# Patient Record
Sex: FEMALE | Race: WHITE
[De-identification: names, ages, dates, MRNs, and addresses within clinical notes are randomized per-mention and may not be internally consistent; named-entity substitution may affect disease eponyms.]

---

## 2018-12-18 ENCOUNTER — HOSPITAL ENCOUNTER (INPATIENT)
Dept: HOSPITAL 97 - ER | Age: 53
LOS: 8 days | Discharge: HOME HEALTH | DRG: 488 | End: 2018-12-26
Attending: FAMILY MEDICINE | Admitting: INTERNAL MEDICINE
Payer: COMMERCIAL

## 2018-12-18 VITALS — BODY MASS INDEX: 35.6 KG/M2

## 2018-12-18 DIAGNOSIS — M23.221: ICD-10-CM

## 2018-12-18 DIAGNOSIS — M71.21: ICD-10-CM

## 2018-12-18 DIAGNOSIS — E78.5: ICD-10-CM

## 2018-12-18 DIAGNOSIS — G25.81: ICD-10-CM

## 2018-12-18 DIAGNOSIS — I10: ICD-10-CM

## 2018-12-18 DIAGNOSIS — K21.9: ICD-10-CM

## 2018-12-18 DIAGNOSIS — B96.20: ICD-10-CM

## 2018-12-18 DIAGNOSIS — M25.461: ICD-10-CM

## 2018-12-18 DIAGNOSIS — M00.9: Primary | ICD-10-CM

## 2018-12-18 DIAGNOSIS — N39.0: ICD-10-CM

## 2018-12-18 DIAGNOSIS — J45.20: ICD-10-CM

## 2018-12-18 DIAGNOSIS — E87.6: ICD-10-CM

## 2018-12-18 LAB
ALBUMIN SERPL BCP-MCNC: 3.8 G/DL (ref 3.4–5)
ALP SERPL-CCNC: 66 U/L (ref 45–117)
ALT SERPL W P-5'-P-CCNC: 40 U/L (ref 12–78)
AST SERPL W P-5'-P-CCNC: 28 U/L (ref 15–37)
BUN BLD-MCNC: 17 MG/DL (ref 7–18)
GLUCOSE SERPLBLD-MCNC: 115 MG/DL (ref 74–106)
HCT VFR BLD CALC: 39.7 % (ref 36–45)
INR BLD: 1.08
LYMPHOCYTES # SPEC AUTO: 2.6 K/UL (ref 0.7–4.9)
MAGNESIUM SERPL-MCNC: 1.9 MG/DL (ref 1.8–2.4)
NT-PROBNP SERPL-MCNC: 109 PG/ML (ref ?–125)
PMV BLD: 8.6 FL (ref 7.6–11.3)
POTASSIUM SERPL-SCNC: 3.5 MMOL/L (ref 3.5–5.1)
RBC # BLD: 4.42 M/UL (ref 3.86–4.86)
TROPONIN (EMERG DEPT USE ONLY): 0.03 NG/ML (ref 0–0.04)

## 2018-12-18 PROCEDURE — 87186 SC STD MICRODIL/AGAR DIL: CPT

## 2018-12-18 PROCEDURE — 87804 INFLUENZA ASSAY W/OPTIC: CPT

## 2018-12-18 PROCEDURE — 96375 TX/PRO/DX INJ NEW DRUG ADDON: CPT

## 2018-12-18 PROCEDURE — 81003 URINALYSIS AUTO W/O SCOPE: CPT

## 2018-12-18 PROCEDURE — 97530 THERAPEUTIC ACTIVITIES: CPT

## 2018-12-18 PROCEDURE — 84132 ASSAY OF SERUM POTASSIUM: CPT

## 2018-12-18 PROCEDURE — 93005 ELECTROCARDIOGRAM TRACING: CPT

## 2018-12-18 PROCEDURE — 87077 CULTURE AEROBIC IDENTIFY: CPT

## 2018-12-18 PROCEDURE — 89050 BODY FLUID CELL COUNT: CPT

## 2018-12-18 PROCEDURE — 87086 URINE CULTURE/COLONY COUNT: CPT

## 2018-12-18 PROCEDURE — 87088 URINE BACTERIA CULTURE: CPT

## 2018-12-18 PROCEDURE — 84484 ASSAY OF TROPONIN QUANT: CPT

## 2018-12-18 PROCEDURE — 99285 EMERGENCY DEPT VISIT HI MDM: CPT

## 2018-12-18 PROCEDURE — 80076 HEPATIC FUNCTION PANEL: CPT

## 2018-12-18 PROCEDURE — 83605 ASSAY OF LACTIC ACID: CPT

## 2018-12-18 PROCEDURE — 87040 BLOOD CULTURE FOR BACTERIA: CPT

## 2018-12-18 PROCEDURE — 85610 PROTHROMBIN TIME: CPT

## 2018-12-18 PROCEDURE — 93971 EXTREMITY STUDY: CPT

## 2018-12-18 PROCEDURE — 87075 CULTR BACTERIA EXCEPT BLOOD: CPT

## 2018-12-18 PROCEDURE — 83880 ASSAY OF NATRIURETIC PEPTIDE: CPT

## 2018-12-18 PROCEDURE — 83690 ASSAY OF LIPASE: CPT

## 2018-12-18 PROCEDURE — 0S9C3ZZ DRAINAGE OF RIGHT KNEE JOINT, PERCUTANEOUS APPROACH: ICD-10-PCS

## 2018-12-18 PROCEDURE — 87205 SMEAR GRAM STAIN: CPT

## 2018-12-18 PROCEDURE — 84145 PROCALCITONIN (PCT): CPT

## 2018-12-18 PROCEDURE — 97116 GAIT TRAINING THERAPY: CPT

## 2018-12-18 PROCEDURE — 83735 ASSAY OF MAGNESIUM: CPT

## 2018-12-18 PROCEDURE — 84550 ASSAY OF BLOOD/URIC ACID: CPT

## 2018-12-18 PROCEDURE — 97163 PT EVAL HIGH COMPLEX 45 MIN: CPT

## 2018-12-18 PROCEDURE — 96365 THER/PROPH/DIAG IV INF INIT: CPT

## 2018-12-18 PROCEDURE — 89060 EXAM SYNOVIAL FLUID CRYSTALS: CPT

## 2018-12-18 PROCEDURE — 71045 X-RAY EXAM CHEST 1 VIEW: CPT

## 2018-12-18 PROCEDURE — 80053 COMPREHEN METABOLIC PANEL: CPT

## 2018-12-18 PROCEDURE — 80202 ASSAY OF VANCOMYCIN: CPT

## 2018-12-18 PROCEDURE — 36415 COLL VENOUS BLD VENIPUNCTURE: CPT

## 2018-12-18 PROCEDURE — 85025 COMPLETE CBC W/AUTO DIFF WBC: CPT

## 2018-12-18 PROCEDURE — 87070 CULTURE OTHR SPECIMN AEROBIC: CPT

## 2018-12-18 PROCEDURE — 80048 BASIC METABOLIC PNL TOTAL CA: CPT

## 2018-12-18 NOTE — EDPHYS
Physician Documentation                                                                           

 [facility]                                                                

Name: Katarzyna Chanel                                                                             

Age: 53 yrs                                                                                       

Sex: Female                                                                                       

: 1965                                                                                   

MRN: [id number]                                                                                   

Arrival Date: 2018                                                                          

Time: 18:11                                                                                       

Account#: [id number]                                                                            

Bed 6                                                                                             

Private MD: Lee Alonso ED Physician Eliseo Goff                                                                      

HPI:                                                                                              

                                                                                             

18:44 This 53 yrs old  Female presents to ER via Wheelchair with complaints of Knee  danyel 

      Pain.                                                                                       

18:44 The patient presents with decreased range of motion, pain, swelling, tenderness. The    danyel 

      complaints affect the posterior aspect of right knee and right knee. Context: The           

      problem was sustained at an unknown site, resulted from an unknown cause, the patient       

      can partially bear weight. Onset: The symptoms/episode began/occurred 3 day(s) ago.         

      Modifying factors: The symptoms are alleviated by remaining still, the symptoms are         

      aggravated by movement. Associated signs and symptoms: Pertinent positives: fever,          

      warmth. fever and chills, right knee swollen. The patient or guardian reports cough,        

      described as mild. Onset: The symptoms/episode began/occurred 1 week(s) ago. Severity       

      of symptoms: At their worst the symptoms were mild, moderate, in the emergency              

      department the symptoms are unchanged.                                                      

                                                                                                  

OB/GYN:                                                                                           

18:20 LMP N/A - Hysterectomy                                                                  ch  

                                                                                                  

Historical:                                                                                       

- Allergies:                                                                                      

18:20 Codeine;                                                                                ch  

- Home Meds:                                                                                      

18:20 Lisinopril Oral [Active]; diclofenac oral oral [Active];                                ch  

- PMHx:                                                                                           

18:20 Hypertension;                                                                           ch  

- PSHx:                                                                                           

18:20 ; Hysterectomy; Cholecystectomy; Heel spurr;                                   ch  

                                                                                                  

- Immunization history:: Adult Immunizations up to date, Flu vaccine is up to date.               

- Social history:: Smoking status: Patient/guardian denies using tobacco.                         

- Ebola Screening: : Patient negative for fever greater than or equal to 101.5 degrees            

  Fahrenheit, and additional compatible Ebola Virus Disease symptoms Patient denies               

  exposure to infectious person Patient denies travel to an Ebola-affected area in the            

  21 days before illness onset No symptoms or risks identified at this time.                      

- Family history:: not pertinent.                                                                 

                                                                                                  

                                                                                                  

ROS:                                                                                              

18:44 Eyes: Negative for injury, pain, redness, and discharge, ENT: Negative for injury,      danyel 

      pain, and discharge, Neck: Negative for injury, pain, and swelling, Cardiovascular:         

      Negative for chest pain, palpitations, and edema, Abdomen/GI: Negative for abdominal        

      pain, nausea, vomiting, diarrhea, and constipation, Back: Negative for injury and pain,     

      : Negative for injury, bleeding, discharge, and swelling, MS/Extremity: Negative for      

      injury and deformity, Skin: Negative for injury, rash, and discoloration, Neuro:            

      Negative for headache, weakness, numbness, tingling, and seizure, Psych: Negative for       

      depression, anxiety, suicide ideation, homicidal ideation, and hallucinations,              

      Allergy/Immunology: Negative for hives, rash, and allergies, Endocrine: Negative for        

      neck swelling, polydipsia, polyuria, polyphagia, and marked weight changes,                 

      Hematologic/Lymphatic: Negative for swollen nodes, abnormal bleeding, and unusual           

      bruising.                                                                                   

18:44 Constitutional: Positive for chills, fever, malaise.                                        

18:44 Cardiovascular: Positive for palpitations.                                                  

18:44 MS/extremity: Positive for decreased range of motion, pain, swelling, tenderness, of        

      the posterior aspect of right knee and right knee.                                          

                                                                                                  

Exam:                                                                                             

18:44 Head/Face:  Normocephalic, atraumatic. Eyes:  Pupils equal round and reactive to light, danyel 

      extra-ocular motions intact.  Lids and lashes normal.  Conjunctiva and sclera are           

      non-icteric and not injected.  Cornea within normal limits.  Periorbital areas with no      

      swelling, redness, or edema. ENT:  Nares patent. No nasal discharge, no septal              

      abnormalities noted.  Tympanic membranes are normal and external auditory canals are        

      clear.  Oropharynx with no redness, swelling, or masses, exudates, or evidence of           

      obstruction, uvula midline.  Mucous membranes moist. Neck:  Trachea midline, no             

      thyromegaly or masses palpated, and no cervical lymphadenopathy.  Supple, full range of     

      motion without nuchal rigidity, or vertebral point tenderness.  No Meningismus.             

      Chest/axilla:  Normal chest wall appearance and motion.  Nontender with no deformity.       

      No lesions are appreciated. Respiratory:  Lungs have equal breath sounds bilaterally,       

      clear to auscultation and percussion.  No rales, rhonchi or wheezes noted.  No              

      increased work of breathing, no retractions or nasal flaring. Abdomen/GI:  Soft,            

      non-tender, with normal bowel sounds.  No distension or tympany.  No guarding or            

      rebound.  No evidence of tenderness throughout. Back:  No spinal tenderness.  No            

      costovertebral tenderness.  Full range of motion. Female :  Normal external               

      genitalia. Skin:  Warm, dry with normal turgor.  Normal color with no rashes, no            

      lesions, and no evidence of cellulitis. Neuro:  Awake and alert, GCS 15, oriented to        

      person, place, time, and situation.  Cranial nerves II-XII grossly intact.  Motor           

      strength 5/5 in all extremities.  Sensory grossly intact.  Cerebellar exam normal.          

      Normal gait. Psych:  Awake, alert, with orientation to person, place and time.              

      Behavior, mood, and affect are within normal limits.                                        

18:44 Constitutional: The patient appears febrile.                                                

18:44 Musculoskeletal/extremity: Extremities: decreased ROM, pain, swelling, tenderness, ROM:     

      limited active range of motion, limited passive range of motion, Circulation is intact      

      in all extremities. Compartment Syndrome exam of affected extremity: is normal. DVT         

      Exam: negative Homans' sign noted on exam, no appreciated bluish discoloration, no          

      erythema, pain, swelling, tenderness, increased warmth, that is moderate, of the right      

      leg, of the  posterior aspect of right knee and right knee.                                 

                                                                                                  

Vital Signs:                                                                                      

18:20  / 105; Pulse 110; Resp 20; Temp 102.6(O); Pulse Ox 99% on R/A; Weight 83.46 kg;  ch  

      Height 5 ft. 2 in. (157.48 cm); Pain 8/10;                                                  

22:15  / 74; Pulse 72; Resp 17 S; Pulse Ox 97% on R/A;                                  jd3 

23:33  / 65; Pulse 62; Resp 17 S; Pulse Ox 95% on R/A;                                  jd3 

18:20 Body Mass Index 33.65 (83.46 kg, 157.48 cm)                                               

                                                                                                  

Procedures:                                                                                       

22:06 Joint Treatment: of right knee using 18 gauge needle, Removed cloudy fluid, yellow      danyel 

      fluid, Specimen sent to lab. Dressed with band aid, Neosporin, Patient tolerated well.      

                                                                                                  

MDM:                                                                                              

18:28 Patient medically screened.                                                             [facility] 

18:49 Data reviewed: vital signs, nurses notes, lab test result(s), EKG, radiologic studies,  [facility] 

      doppler, plain films.                                                                       

                                                                                                  

                                                                                             

18:29 Order name: Basic Metabolic Panel; Complete Time: 21:14                                   

                                                                                             

18: Order name: CBC with Diff; Complete Time: 21:14                                           

                                                                                             

18: Order name: LFT's; Complete Time: 21:14                                                   

18                                                                                             

18:29 Order name: Magnesium; Complete Time: 21:14                                               

                                                                                             

18:29 Order name: NT PRO-BNP; Complete Time: 21:14                                              

                                                                                             

18:29 Order name: PT-INR; Complete Time: 21:14                                                  

18                                                                                             

18:29 Order name: Troponin (emerg Dept Use Only); Complete Time: 21:14                          

                                                                                             

18:30 Order name: Lactate; Complete Time: 21:14                                                 

                                                                                             

18:30 Order name: Procalcitonin; Complete Time: 21:14                                           

                                                                                             

18:30 Order name: Blood Culture Adult (2)                                                       

                                                                                             

18:43 Order name: Flu; Complete Time: 21:14                                                   [facility] 

                                                                                             

18:43 Order name: Lipase; Complete Time: 21:14                                                [facility] 

                                                                                             

18:43 Order name: Urine Culture                                                               [facility] 

                                                                                             

20:01 Order name: Urine Dipstick--Ancillary (enter results); Complete Time: 21:14             mt  

                                                                                             

18:29 Order name: XRAY Chest (1 view); Complete Time: 21:14                                     

                                                                                             

18:29 Order name: EKG; Complete Time: 18:30                                                     

                                                                                             

18:29 Order name: Cardiac monitoring; Complete Time: 18:41                                      

                                                                                             

18:29 Order name: EKG - Nurse/Tech; Complete Time: 19:02                                        

                                                                                             

18:30 Order name: XRAY Knee RIGHT 3 view; Complete Time: 21:14                                  

                                                                                             

18:43 Order name: US Extremity Venous Unilateral Ltd; Complete Time: 21:14                    [facility] 

                                                                                             

21:42 Order name: CONS Physician Consult                                                      [facility]

                                                                                             

22:36 Order name: Body Fluid Cell Count                                                       [facility]

                                                                                             

22:36 Order name: Miscellaneous Test Lab                                                      [facility]

                                                                                             

22:36 Order name: Body Fluid Crystals                                                         [facility]

                                                                                             

22:36 Order name: Body Fluid Culture                                                          [facility]

                                                                                             

18:29 Order name: IV Saline Lock; Complete Time: 18:41                                          

                                                                                             

18:29 Order name: Labs collected and sent; Complete Time: 18:41                                 

                                                                                             

18:29 Order name: O2 Per Protocol; Complete Time: 18:41                                         

                                                                                             

18:29 Order name: O2 Sat Monitoring; Complete Time: 18:41                                       

                                                                                             

18:43 Order name: Urine Dipstick-Ancillary (obtain specimen); Complete Time: 21:29            [facility] 

                                                                                                  

Administered Medications:                                                                         

18:30 Drug: Tylenol 1000 mg Route: PO;                                                          

23:12 Follow up: Response: No adverse reaction                                                jd3 

18:53 Drug: fentaNYL (PF) 50 mcg Route: IVP; Site: right antecubital;                         iw  

22:10 Follow up: Response: No adverse reaction                                                jd3 

18:53 Drug: Zofran 4 mg Route: IVP; Site: right antecubital;                                  iw  

22:11 Follow up: Response: No adverse reaction                                                jd3 

22:10 Drug: Cefepime 2 grams Route: IVPB; Rate: 200 ml/hr; Infused Over: 30 mins; Site: right jd3 

      antecubital;                                                                                

22:49 Follow up: Response: No adverse reaction; IV Status: Completed infusion                 j 

22:48 Drug: vancoMYCIN 1 grams Route: IVPB; Infused Over: 2 hrs; Site: right antecubital;     j 

23:00 Follow up: Response: No adverse reaction; IV Status: Infusion continued upon admission  j 

                                                                                                  

                                                                                                  

Disposition:                                                                                      

18 21:25 Hospitalization ordered by Lee Alonso for Inpatient Admission. Preliminary       

  diagnosis are Effusion, right knee, Synovial cyst of popliteal space [Baker],                   

  right knee - 5 cm, Fever, unspecified, Cough.                                                   

- Bed requested for Telemetry/MedSurg (Inpatient).                                                

- Status is Inpatient Admission.                                                              jd3 

- Condition is Stable.                                                                            

- Problem is new.                                                                                 

- Symptoms have improved.                                                                         

UTI on Admission? No                                                                              

                                                                                                  

                                                                                                  

                                                                                                  

Signatures:                                                                                       

Dispatcher [employer]                           EDMS                                                 

Ursula Sevilla RN RN ch Lewis, Kimberly, RN RN kl Anderson, Corey, MD MD cha Williams, Irene, RN RN iw Davies, Jonathon, RN RN   jd3                                                  

                                                                                                  

Corrections: (The following items were deleted from the chart)                                    

21:26 21:25 Hospitalization Ordered by Lee Alonso MD for Inpatient Admission. Preliminary    [facility] 

      diagnosis is Effusion, right knee; Synovial cyst of popliteal space [Sosa], right knee     

      - 5 cm. Bed requested for Telemetry/MedSurg (Inpatient). Status is Inpatient Admission.     

      Condition is Stable. Problem is new. Symptoms have improved. UTI on Admission? No. [facility]      

21:56 21:26 2018 21:25 Hospitalization Ordered by Lee Alonso MD for Inpatient          kl  

      Admission. Preliminary diagnosis is Effusion, right knee; Synovial cyst of popliteal        

      space [Sosa], right knee - 5 cm; Fever, unspecified; Cough. Bed requested for              

      Telemetry/MedSurg (Inpatient). Status is Inpatient Admission. Condition is Stable.          

      Problem is new. Symptoms have improved. UTI on Admission? No. danyel                           

23:38 21:56 2018 21:25 Hospitalization Ordered by Lee Alonso MD for Inpatient          jd3 

      Admission. Preliminary diagnosis is Effusion, right knee; Synovial cyst of popliteal        

      space [Sosa], right knee - 5 cm; Fever, unspecified; Cough. Bed requested for              

      Telemetry/MedSurg (Inpatient). Status is Inpatient Admission. Condition is Stable.          

      Problem is new. Symptoms have improved. UTI on Admission? No. kl                            

                                                                                                  

**************************************************************************************************

## 2018-12-18 NOTE — RAD REPORT
EXAM DESCRIPTION:  US - Extremity Venous Uni Ltd - 12/18/2018 7:13 pm

 

CLINICAL HISTORY:  Leg pain and swelling

 

COMPARISON:  None.

 

TECHNIQUE:  Real-time sonographic evaluation of the right lower extremity deep venous systems was per
formed.

 

FINDINGS:  Normal compressibility, flow augmentation, phasic flow and spontaneous flow are identified
 in the right lower extremity common femoral, superficial femoral, popliteal and posterior tibial vei
ns. No intraluminal filling defects seen.

 

A 5 centimeter complex popliteal fossa cyst is present. No cyst rupture or hemorrhage suspected.

 

IMPRESSION:  No DVT in the right lower extremity.

 

Large 5 centimeter popliteal fossa cyst.

## 2018-12-18 NOTE — RAD REPORT
EXAM DESCRIPTION:  RAD - Chest Single View - 12/18/2018 7:40 pm

 

CLINICAL HISTORY:  Chest pain

 

COMPARISON:  March 2016

 

TECHNIQUE:  AP portable chest image was obtained 1920 hours .

 

FINDINGS:  Lungs are clear. Heart and vasculature are normal. No measurable pleural effusion and no p
neumothorax. No acute bony abnormality seen. No acute aortic findings suspected.

 

IMPRESSION:  No acute cardiopulmonary process.

 

 No significant interval change.

## 2018-12-18 NOTE — ER
Nurse's Notes                                                                                     

 [facility]                                                                

Name: Katarzyna Chanel                                                                             

Age: 53 yrs                                                                                       

Sex: Female                                                                                       

: 1965                                                                                   

MRN: [id number]                                                                                   

Arrival Date: 2018                                                                          

Time: 18:11                                                                                       

Account#: [id number]                                                                            

Bed 6                                                                                             

Private MD: Lee Alonso                                                                          

Diagnosis: Effusion, right knee;Synovial cyst of popliteal space [Baker], right knee-5 cm;Fever,  

  unspecified;Cough                                                                               

                                                                                                  

Presentation:                                                                                     

                                                                                             

18:18 Presenting complaint: Patient states: pain to R knee for the past two weeks, the past   ch  

      day it has gotten very painful, and swollen all around. Dr. Alonso said it probably           

      wasn't a blood clot because it was hard, but now that it is swelling I should come in.      

      as well I just dont feel well. Transition of care: patient was not received from            

      another setting of care. Onset of symptoms was 2018. Risk Assessment: Do       

      you want to hurt yourself or someone else? Patient reports no desire to harm self or        

      others. Initial Sepsis Screen: Does the patient meet any 2 criteria? Temp <36.0*C           

      (96.8*F)) or > 38.3*C (100.9*F). HR > 90 bpm. Yes Does the patient have a suspected         

      source of infection? Yes: Other: possible joint infection. Care prior to arrival: None.     

18:18 Method Of Arrival: Wheelchair                                                             

18:18 Acuity: LAURA 2                                                                             

                                                                                                  

Triage Assessment:                                                                                

18:20 General: Appears in no apparent distress. comfortable, Behavior is calm, cooperative,   ch  

      appropriate for age. Pain: Complains of pain in right knee.                                 

                                                                                                  

OB/GYN:                                                                                           

18:20 LMP N/A - Hysterectomy                                                                    

                                                                                                  

Historical:                                                                                       

- Allergies:                                                                                      

18:20 Codeine;                                                                                  

- Home Meds:                                                                                      

18:20 Lisinopril Oral [Active]; diclofenac oral oral [Active];                                ch  

- PMHx:                                                                                           

18:20 Hypertension;                                                                             

- PSHx:                                                                                           

18:20 ; Hysterectomy; Cholecystectomy; Heel spurr;                                     

                                                                                                  

- Immunization history:: Adult Immunizations up to date, Flu vaccine is up to date.               

- Social history:: Smoking status: Patient/guardian denies using tobacco.                         

- Ebola Screening: : Patient negative for fever greater than or equal to 101.5 degrees            

  Fahrenheit, and additional compatible Ebola Virus Disease symptoms Patient denies               

  exposure to infectious person Patient denies travel to an Ebola-affected area in the            

  21 days before illness onset No symptoms or risks identified at this time.                      

- Family history:: not pertinent.                                                                 

                                                                                                  

                                                                                                  

Screenin:30 Abuse screen: Denies threats or abuse. Nutritional screening: No deficits noted.        aa5 

      Tuberculosis screening: No symptoms or risk factors identified. Fall Risk None              

      identified.                                                                                 

                                                                                                  

Assessment:                                                                                       

18:30 General: Appears uncomfortable, Behavior is calm, cooperative. Pain: Complains of pain  aa5 

      in posterior aspect of right knee Pain currently is 8 out of 10 on a pain scale.            

      Quality of pain is described as sharp, tender, Pain began 2 weeks ago Is continuous,        

      Aggravated by increased activity, repositioning. Neuro: Level of Consciousness is           

      awake, alert, obeys commands, Oriented to person, place, time, situation.                   

      Cardiovascular: Heart tones S1 S2 present Rhythm is regular. Respiratory: Airway is         

      patent Respiratory effort is even, unlabored, Respiratory pattern is regular,               

      symmetrical, Breath sounds are clear bilaterally. GI: Abdomen is round non-distended,       

      Bowel sounds present X 4 quads. Abd is soft and non tender X 4 quads. : No signs          

      and/or symptoms were reported regarding the genitourinary system. EENT: No signs and/or     

      symptoms were reported regarding the EENT system. Derm: Skin is dry, Skin is normal,        

      Skin temperature is hot. Musculoskeletal: Swelling present in posterior aspect of right     

      knee and anterior aspect of right knee. Pt denies any injury to right knee.                 

22:15 Reassessment: Patient appears in no apparent distress at this time. No changes from     jd3 

      previously documented assessment. Patient and/or family updated on plan of care and         

      expected duration. Pain level reassessed. Patient is alert, oriented x 3, equal             

      unlabored respirations, skin warm/dry/pink.                                                 

23:33 Reassessment: Patient appears in no apparent distress at this time. No changes from     jd3 

      previously documented assessment. Patient and/or family updated on plan of care and         

      expected duration. Pain level reassessed. Patient is alert, oriented x 3, equal             

      unlabored respirations, skin warm/dry/pink.                                                 

                                                                                                  

Vital Signs:                                                                                      

18:20  / 105; Pulse 110; Resp 20; Temp 102.6(O); Pulse Ox 99% on R/A; Weight 83.46 kg;  ch  

      Height 5 ft. 2 in. (157.48 cm); Pain 8/10;                                                  

22:15  / 74; Pulse 72; Resp 17 S; Pulse Ox 97% on R/A;                                  jd3 

23:33  / 65; Pulse 62; Resp 17 S; Pulse Ox 95% on R/A;                                  jd3 

18:20 Body Mass Index 33.65 (83.46 kg, 157.48 cm)                                               

                                                                                                  

ED Course:                                                                                        

18:11 Patient arrived in ED.                                                                  sb2 

18:11 Lee Alonso MD is Private Physician.                                                  sb2 

18:19 Triage completed.                                                                       ch  

18:20 Arm band placed on left wrist. Patient placed in an exam room, on a stretcher.          ch  

18:28 Eliseo Goff MD is Attending Physician.                                             danyel 

18:30 Patient has correct armband on for positive identification. Placed in gown. Bed in low  aa5 

      position. Call light in reach. Side rails up X2. Cardiac monitor on. Pulse ox on. NIBP      

      on.                                                                                         

18:40 Maryuri Baker RN is Primary Nurse.                                                   aa5 

18:40 Initial lab(s) drawn, by me, sent to lab. Inserted saline lock: 20 gauge in right       aa5 

      antecubital area, using aseptic technique. Blood collected.                                 

18:40 No provider procedures requiring assistance completed.                                  aa5 

19:03 EKG done, by ED staff, reviewed by Eliseo Goff MD.                                   ag  

19:05 Report given to AGATA Staples.                                                              aa5 

19:06 Ultrasound completed. Patient tolerated well.                                           cy  

19:06 US Extremity Venous Unilateral Ltd In Process Unspecified.                              EDMS

19:41 XRAY Chest (1 view) In Process Unspecified.                                             EDMS

19:41 XRAY Knee RIGHT 3 view In Process Unspecified.                                          EDMS

21:21 Lee Alonso MD is Hospitalizing Provider.                                             danyel 

22:59 Patient admitted, IV remains in place.                                                  jd3 

                                                                                                  

Administered Medications:                                                                         

18:30 Drug: Tylenol 1000 mg Route: PO;                                                        ch  

23:12 Follow up: Response: No adverse reaction                                                jd3 

18:53 Drug: fentaNYL (PF) 50 mcg Route: IVP; Site: right antecubital;                         iw  

22:10 Follow up: Response: No adverse reaction                                                jd3 

18:53 Drug: Zofran 4 mg Route: IVP; Site: right antecubital;                                  iw  

22:11 Follow up: Response: No adverse reaction                                                jd3 

22:10 Drug: Cefepime 2 grams Route: IVPB; Rate: 200 ml/hr; Infused Over: 30 mins; Site: right jd3 

      antecubital;                                                                                

22:49 Follow up: Response: No adverse reaction; IV Status: Completed infusion                 jd3 

22:48 Drug: vancoMYCIN 1 grams Route: IVPB; Infused Over: 2 hrs; Site: right antecubital;     jd3 

23:00 Follow up: Response: No adverse reaction; IV Status: Infusion continued upon admission  jd3 

                                                                                                  

                                                                                                  

Outcome:                                                                                          

21:25 Decision to Hospitalize by Provider.                                                    danyel 

22:58 Admitted to Med/surg accompanied by tech, via stretcher, room 408, with chart, Report   jd3 

      called to  Katelyn PARMAR                                                                        

22:58 Condition: stable                                                                           

22:58 Instructed on the need for admit, Demonstrated understanding of instructions.               

23:38 Patient left the ED.                                                                    jd3 

                                                                                                  

Signatures:                                                                                       

Dispatcher [employer]                           EDUrsula Cool, RN                  Eliseo Virk ch, MD MD cha Williams, Irene RN                     Maryuri Liu RN                     RN   Serenity Pelayo Jonathon, RN RN jd3 Yong, Chheannith cy Billeau, Sheri                               sb2                                                  

                                                                                                  

Corrections: (The following items were deleted from the chart)                                    

19:20 18:30 Musculoskeletal: Range of motion: intact in all extremities, aa5                  aa5 

                                                                                                  

**************************************************************************************************

## 2018-12-18 NOTE — RAD REPORT
EXAM DESCRIPTION:  RAD - Knee Right 3 View - 12/18/2018 7:40 pm

 

CLINICAL HISTORY:  Progressive knee pain

 

COMPARISON:  None.

 

FINDINGS:  No fracture, dislocation or periosteal reaction.Small to moderate joint effusion is presen
t. No joint space narrowing. Degenerative meniscal calcifications are present. Joint capsule calcific
ations are seen. No suspicious soft tissue finding.

 

 

IMPRESSION:  Right knee small to moderate joint effusion.

 

Degenerative meniscal calcifications. No acute bone finding.

 

Clinical concerns for internal derangement or occult bony injury could be further assessed with MR im
aging.

## 2018-12-19 LAB
BUN BLD-MCNC: 17 MG/DL (ref 7–18)
GLUCOSE SERPLBLD-MCNC: 116 MG/DL (ref 74–106)
HCT VFR BLD CALC: 35.6 % (ref 36–45)
LYMPHOCYTES # SPEC AUTO: 2.6 K/UL (ref 0.7–4.9)
PMV BLD: 8.9 FL (ref 7.6–11.3)
POTASSIUM SERPL-SCNC: 3.6 MMOL/L (ref 3.5–5.1)
RBC # BLD: 3.99 M/UL (ref 3.86–4.86)

## 2018-12-19 PROCEDURE — 0S9C00Z DRAINAGE OF RIGHT KNEE JOINT WITH DRAINAGE DEVICE, OPEN APPROACH: ICD-10-PCS

## 2018-12-19 RX ADMIN — ENOXAPARIN SODIUM SCH MG: 40 INJECTION SUBCUTANEOUS at 08:38

## 2018-12-19 RX ADMIN — SODIUM CHLORIDE SCH MLS: 9 INJECTION, SOLUTION INTRAVENOUS at 18:40

## 2018-12-19 RX ADMIN — MORPHINE SULFATE PRN MG: 4 INJECTION, SOLUTION INTRAMUSCULAR; INTRAVENOUS at 20:28

## 2018-12-19 RX ADMIN — SODIUM CHLORIDE SCH: 0.9 INJECTION, SOLUTION INTRAVENOUS at 08:07

## 2018-12-19 RX ADMIN — Medication SCH: at 09:00

## 2018-12-19 RX ADMIN — ONDANSETRON PRN MG: 2 INJECTION INTRAMUSCULAR; INTRAVENOUS at 19:37

## 2018-12-19 RX ADMIN — SODIUM CHLORIDE SCH MLS: 0.9 INJECTION, SOLUTION INTRAVENOUS at 02:34

## 2018-12-19 RX ADMIN — DEXTROSE AND SODIUM CHLORIDE SCH MLS: 5; .9 INJECTION, SOLUTION INTRAVENOUS at 11:00

## 2018-12-19 RX ADMIN — CEFEPIME SCH MLS: 1 INJECTION, POWDER, FOR SOLUTION INTRAMUSCULAR; INTRAVENOUS at 10:00

## 2018-12-19 RX ADMIN — CEFEPIME SCH MLS: 1 INJECTION, POWDER, FOR SOLUTION INTRAMUSCULAR; INTRAVENOUS at 22:48

## 2018-12-19 RX ADMIN — Medication SCH ML: at 22:48

## 2018-12-19 RX ADMIN — SODIUM CHLORIDE SCH: 0.9 INJECTION, SOLUTION INTRAVENOUS at 16:07

## 2018-12-19 NOTE — RAD REPORT
EXAM DESCRIPTION:  MRI - Knee Right Wo Cont - 12/19/2018 9:52 am

 

CLINICAL HISTORY:  R knee pain, swelling, baker's cyst

Pain and swelling

 

COMPARISON:  Knee Right 3 View dated 12/18/2018

 

FINDINGS:  The medial meniscus demonstrates abnormal intermediate signal in the posterior horn contac
ting the inferior articular surface, compatible with a nondisplaced tear. The lateral meniscus demons
trates degenerative signal without a tear.

 

The ACL and PCL are intact.

 

The collateral ligaments are intact.

 

Static patellar alignment is normal. Patellar retinacula are intact. Patellar and quadriceps tendons 
are normal. 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

 

No fracture suspected. Osteoarthritic changes are noted particularly involving the medial compartment
 with chondromalacia and subchondral cyst formation.

 

A moderate to large joint effusion is present with a large Baker's cyst noted measuring 5.7 x 4.9 x 3
.2 cm (CC x T x AP).

 

IMPRESSION:  Large Baker's cyst with moderate to large joint effusion noted.

 

Osteoarthritic changes involving the patellofemoral joint and medial joint compartment predominately.


 

Small nondisplaced medial meniscal tear.

## 2018-12-19 NOTE — EKG
Test Date:    2018-12-18               Test Time:    18:48:04

Technician:   AMG                                    

                                                     

MEASUREMENT RESULTS:                                       

Intervals:                                           

Rate:         91                                     

MA:           146                                    

QRSD:         84                                     

QT:           364                                    

QTc:          447                                    

Axis:                                                

P:            37                                     

MA:           146                                    

QRS:          -20                                    

T:            25                                     

                                                     

INTERPRETIVE STATEMENTS:                                       

                                                     

Normal sinus rhythm

Moderate voltage criteria for LVH, may be normal variant

Borderline ECG

No previous ECG available for comparison



Electronically Signed On 12-19-18 07:43:23 CST by Matt Garrett

## 2018-12-19 NOTE — CON
Preoperative Diagnosis:  Right knee effusion, probable septic knee.



History Of Present Illness:  Ms. Chanel is a 53-year-old woman, 5 foot and 2 inches, 194 pounds, comp
lains of pain in her right knee.  Historically, she was experiencing a slight amount of pain on [place]
y, went to see Dr. Alonso on Monday, and by yesterday, was unable to walk.  She was admitted to the [facility] room in the early hours of the morning last night.  She was had an aspiration performed and fe
lt some relief after aspiration.  The aspiration fluid reveals no organisms, but white blood cell cou
nt of 44,000 with no crystals.  She has no history of gout.  She has been febrile with a normal perip
heral white count, but the fluid within the knee showed a profound left shift.



Physical Examination:

General:  On exam, she is she is well developed, well nourished, alert, and oriented, complains of pa
in in the knee.  She reports that the effusion has recurred since her initial aspiration. 

Vital Signs:  Her fever was up to 101.8.



Laboratory Data:  Her peripheral white count is 7.9.  Cultures are pending.  The Gram stain was negat
willis.  She appears to have some aspect of urinary tract infection between 10,00 and 100,000 CFUs/mL.  
There are 3+ gram-negative rods in her urine on a clean-catch specimen.



Impression:  This seems likely represents a septic knee.  We will proceed with an irrigation and debr
idement and arthrotomy of the knee 

with large-bore Hemovac drains.  She is being taken to the operative suite in short order.





EDILIA

DD:  12/19/2018 17:15:17Voice ID:  [id number]

DT:  12/19/2018 22:43:01Report ID:  [id number]

## 2018-12-20 RX ADMIN — DEXTROSE AND SODIUM CHLORIDE SCH: 5; .9 INJECTION, SOLUTION INTRAVENOUS at 00:20

## 2018-12-20 RX ADMIN — CEFEPIME SCH MLS: 1 INJECTION, POWDER, FOR SOLUTION INTRAMUSCULAR; INTRAVENOUS at 20:26

## 2018-12-20 RX ADMIN — ONDANSETRON PRN MG: 2 INJECTION INTRAMUSCULAR; INTRAVENOUS at 18:01

## 2018-12-20 RX ADMIN — MORPHINE SULFATE PRN MG: 4 INJECTION, SOLUTION INTRAMUSCULAR; INTRAVENOUS at 01:07

## 2018-12-20 RX ADMIN — PROMETHAZINE HYDROCHLORIDE PRN MG: 25 INJECTION INTRAMUSCULAR; INTRAVENOUS at 14:49

## 2018-12-20 RX ADMIN — Medication SCH ML: at 09:16

## 2018-12-20 RX ADMIN — Medication SCH EA: at 09:15

## 2018-12-20 RX ADMIN — ONDANSETRON PRN MG: 2 INJECTION INTRAMUSCULAR; INTRAVENOUS at 12:37

## 2018-12-20 RX ADMIN — ENOXAPARIN SODIUM SCH MG: 40 INJECTION SUBCUTANEOUS at 09:14

## 2018-12-20 RX ADMIN — HYDROMORPHONE HYDROCHLORIDE PRN MG: 1 INJECTION, SOLUTION INTRAMUSCULAR; INTRAVENOUS; SUBCUTANEOUS at 07:52

## 2018-12-20 RX ADMIN — Medication SCH ML: at 20:26

## 2018-12-20 RX ADMIN — SODIUM CHLORIDE SCH MLS: 9 INJECTION, SOLUTION INTRAVENOUS at 09:16

## 2018-12-20 RX ADMIN — ONDANSETRON PRN MG: 2 INJECTION INTRAMUSCULAR; INTRAVENOUS at 05:49

## 2018-12-20 RX ADMIN — CEFEPIME SCH MLS: 1 INJECTION, POWDER, FOR SOLUTION INTRAMUSCULAR; INTRAVENOUS at 09:16

## 2018-12-20 RX ADMIN — HYDROMORPHONE HYDROCHLORIDE PRN MG: 1 INJECTION, SOLUTION INTRAMUSCULAR; INTRAVENOUS; SUBCUTANEOUS at 22:33

## 2018-12-20 RX ADMIN — PROMETHAZINE HYDROCHLORIDE PRN MG: 25 INJECTION INTRAMUSCULAR; INTRAVENOUS at 22:32

## 2018-12-20 RX ADMIN — HYDROMORPHONE HYDROCHLORIDE PRN MG: 1 INJECTION, SOLUTION INTRAMUSCULAR; INTRAVENOUS; SUBCUTANEOUS at 12:38

## 2018-12-20 RX ADMIN — HYDROMORPHONE HYDROCHLORIDE PRN MG: 1 INJECTION, SOLUTION INTRAMUSCULAR; INTRAVENOUS; SUBCUTANEOUS at 18:00

## 2018-12-20 RX ADMIN — MORPHINE SULFATE PRN MG: 4 INJECTION, SOLUTION INTRAMUSCULAR; INTRAVENOUS at 05:31

## 2018-12-20 NOTE — OP
Surgeon:  Brent Ford MD



Preoperative Diagnosis:  Septic right knee.



Postoperative Diagnosis:  Septic right knee.



Procedure Performed:  Arthrotomy and I and D of right knee.



First Assistant:  None.



Anesthesia:  General.



Disposition:  Recovery room stable.



Operative Report In Detail:  The patient was taken to operative suite.  Placed in supine position.  I
nduced anesthesia.  Right knee prepped and draped __________ with exsanguination via gravity techniqu
e.  An incision was made just medial to the patella, taken to the retinaculum, into the joint.  Copio
us amount of __________ cloudy fluid was expressed.  Cultures were taken.  It was irrigated with 4 L 
of sterile saline solution.  Two large-bore Hemovac drains were placed exiting in the superior aspect
 of the wound.  A full-thickness closure of the wound was performed followed by application of a knee
 immobilizer.  The patient is being reversed from anesthesia and should be in the recovery room at ti
me of this dictation.





EDILIA

DD:  12/19/2018 18:00:23Voice ID:  [id number]

DT:  12/20/2018 04:12:41Report ID:  [id number]

## 2018-12-20 NOTE — HP
Date of Admission:  2018



Chief Complaint:  Right knee swelling and pain.



History Of Present Illness:  This is a 53-year-old pleasant female patient who 
started to have some swelling and pain on the back of the right knee.  This 
pain was very minimum.  She had no fever.  No fall.  No injury.  She came in to 
see me on 2018.  After I examined, I was concerned about Baker cyst of 
the right popliteal fossa, but the swelling did not have typical consistency of 
cyst as there were some areas that were of hard consistency like underlying 
mass or bony consistency.  The patient was advised to see orthopedic surgeon on 
an outpatient basis.  Meanwhile, yesterday she contacted office and informed us 
that her pain from back of the knee now has actually started to involve her 
anterior knee and she has started to have swelling of her knee itself.  With 
that complaint, she was advised to come to the emergency room and when she came 
into emergency room, her temperature was 102 degrees Fahrenheit and the patient 
did not realize at home that she had fever.  After she was evaluated in the ER, 
routine labs were done, chest x-ray.  There was no definite source of infection 
anywhere else except we were concerned about possibility of septic arthritis 
and arthrocentesis was done in the emergency room and the patient was started 
after arthrocentesis on IV vancomycin and IV cefepime and admitted to the 
hospital.  I saw her this morning for this hospital admission.



Allergies:  TO CODEINE.



Review of Systems:

Constitutional:  As mentioned above. 

Musculoskeletal:  As mentioned above. 



All other systems reviewed and negative.



Past Medical History:  Significant for hyperlipidemia, hypertension, impaired 
fasting glucose, restless legs syndrome, gastroesophageal reflux disease, mild 
intermittent asthma.



Past Surgical History:  Total hysterectomy in , hernia repair, breast biopsy
, cholecystectomy, , removal of bone spur from bilateral foot and 
removal of uterine polyps.



Family History:  Significant for lung cancer, hypertension, cirrhosis of liver, 
ovarian cancer, diabetes.



Social History:  Negative for smoking or alcohol use.



Medications:  Aspirin 81 mg p.o. daily, diclofenac 50 mg p.o. 2 times a day 
which was started 2 days ago, Nexium 40 mg p.o. daily, lisinopril/HCTZ 20/25 
one p.o. daily, ProAir inhaler p.r.n.



Physical Examination:

Vital Signs:  When she came into emergency room, her temperature was 102.6 
degrees Fahrenheit, pulse rate 110, respiratory rate 20, blood pressure 168/105
, oxygen saturation 99%, height 5 feet 2 inches, weight 194 pounds. 

General:  Awake, alert, oriented, not in distress. 

HEENT:  Head atraumatic, normocephalic.  Conjunctivae nonerythematous.  Sclerae 
white.  Mouth, no thrush or edema noted.  Ears/Nose, no mass, lesion, discharge 
noted. 

Neck:  Supple. No JVD, lymph nodes, bruit, thyromegaly noted. 

Lungs:  Bilateral good equal air entry. Clear to auscultation. No rhonchi.  No 
rales. 

Heart:  Normal heart sounds, no murmur or gallop. 

Abdomen:  Soft, bowel sounds normal. No guarding, rigidity, tenderness, mass, 
hepatosplenomegaly, distention, or bruit noted. 

Extremities:  The patient has swelling of the right knee anteriorly as well as 
posteriorly and the right popliteal fossa has presence of Baker cyst. 

Skin:  No rash, ulcer, cellulitis. 

Lymphatics:  No lymph node enlargement in neck, supraclavicular, 
infraclavicular region. 

Neuro:  No focal neurological deficit. 

Chest:  Unremarkable. 

External Genitalia:  Deferred. 

Rectal:  Deferred.



Laboratory Data:  Yesterday white count 9.9, hemoglobin 13.6, platelets 243.  
This morning white count 7.9, hemoglobin 12.1, platelets 192.  Yesterday sodium 
137, potassium 3.5, chloride 102, bicarb 31, BUN 17, creatinine 0.90, glucose 
115.  Liver function tests unremarkable.  Procalcitonin less than 0.05.  Lipase 
228.  This morning sodium 139, potassium 3.6, chloride 100, bicarb 30, BUN 17, 
creatinine 0.70, glucose 116.  Urinalysis shows positive for leukocyte esterase
, otherwise negative.  Synovial fluid analysis from the right knee aspirate 
done in the emergency room yesterday:  WBC count 46,626; RBC count 5908; 
differential 94% neutrophils.  Synovial fluid culture pending.  Gram stain, no 
organisms or WBC was reported on the gram stain.  Influenza A and B test 
negative.  Blood culture pending.  Right knee x-ray shows mild-to-moderate 
effusion, degenerative meniscal calcification, no acute bone findings.  Venous 
Doppler, no DVT of right lower extremity.



Impression:  

1.   Septic arthritis, right knee.

2.   Baker cyst, right knee.

3.   Hypertension.

4.   Hyperlipidemia.

5.   Mild intermittent asthma.

6.   Impaired fasting glucose.

7.   Restless legs syndrome.

8.   Gastroesophageal reflux disease.



Plan:  Admit the patient to hospital for further evaluation and management of 
this problem.  The patient is appropriate for inpatient and is expected to 
spend 2 midnights in hospital.  We will go ahead and give empiric antibiotic, 
which are cefepime and vancomycin.  Consult orthopedic surgeon, Dr. Ford 
who is on-call.  I did call him this morning and discussed details with him 
about her clinical presentation including her synovial fluid analysis and Dr. Ford has concerns about septic arthritis so he has decided to take her to 
surgery for this concern and surgery will be done today.  We have kept the 
patient n.p.o. We will give IV fluid, pain medications per order, IV 
antibiotics per order.  Home medications will be continued per order.  I will 
see her tomorrow for followup.  Uric acid and MRI of the right knee were 
ordered.





MAIA/DERICK

DD:  2018 20:22:21   Voice ID:  [id number]

MTDD

## 2018-12-20 NOTE — P.PN
Subjective


Date of Service: 12/20/18


Chief Complaint: right knee pain


Subjective: No new changes (pod 1 s/p wash out of right knee)





Review of Systems


10-point ROS is otherwise unremarkable





Physical Examination





- Vital Signs


Temperature: 98.4 F


Blood Pressure: 142/79


Pulse: 73


Respirations: 16


Pulse Ox (%): 98





- Physical Exam


General: Oriented x3


HEENT: Atraumatic, Normocephalic, PERRLA


Musculoskeletal: Other (dressings intact, drain in place)





- Studies


Microbiology Data (last 24 hrs): 








12/18/18 22:05   Body Fluid - Knee   Gram Stain - Final


12/18/18 19:43   Clean Catch Urine   [place] Count - Final


                            BETWEEN 10,000 & 100,000 CFU/ML


12/18/18 19:43   Clean Catch Urine    - Final


                            Escherichia Coli








Assessment And Plan





- Current Problems (Diagnosis)


(1) Effusion of knee joint right


Current Visit: Yes   Status: Acute   


Plan: 


right knee effusion washed out pod 1, dr Ford will pull the drains tomorrow





Plan to discharge in: 48 Hours

## 2018-12-21 LAB
BUN BLD-MCNC: 9 MG/DL (ref 7–18)
GLUCOSE SERPLBLD-MCNC: 120 MG/DL (ref 74–106)
HCT VFR BLD CALC: 34.8 % (ref 36–45)
LYMPHOCYTES # SPEC AUTO: 1.9 K/UL (ref 0.7–4.9)
MAGNESIUM SERPL-MCNC: 1.6 MG/DL (ref 1.8–2.4)
PMV BLD: 8.6 FL (ref 7.6–11.3)
POTASSIUM SERPL-SCNC: 3.3 MMOL/L (ref 3.5–5.1)
RBC # BLD: 3.94 M/UL (ref 3.86–4.86)

## 2018-12-21 RX ADMIN — HYDROMORPHONE HYDROCHLORIDE PRN MG: 1 INJECTION, SOLUTION INTRAMUSCULAR; INTRAVENOUS; SUBCUTANEOUS at 02:03

## 2018-12-21 RX ADMIN — SODIUM CHLORIDE SCH MLS: 9 INJECTION, SOLUTION INTRAVENOUS at 05:13

## 2018-12-21 RX ADMIN — HYDROMORPHONE HYDROCHLORIDE PRN MG: 1 INJECTION, SOLUTION INTRAMUSCULAR; INTRAVENOUS; SUBCUTANEOUS at 12:40

## 2018-12-21 RX ADMIN — PROMETHAZINE HYDROCHLORIDE PRN MG: 25 INJECTION INTRAMUSCULAR; INTRAVENOUS at 05:30

## 2018-12-21 RX ADMIN — CEFEPIME SCH MLS: 1 INJECTION, POWDER, FOR SOLUTION INTRAMUSCULAR; INTRAVENOUS at 20:13

## 2018-12-21 RX ADMIN — HYDROMORPHONE HYDROCHLORIDE PRN MG: 1 INJECTION, SOLUTION INTRAMUSCULAR; INTRAVENOUS; SUBCUTANEOUS at 05:30

## 2018-12-21 RX ADMIN — Medication SCH EA: at 08:18

## 2018-12-21 RX ADMIN — CEFEPIME SCH MLS: 1 INJECTION, POWDER, FOR SOLUTION INTRAMUSCULAR; INTRAVENOUS at 08:27

## 2018-12-21 RX ADMIN — Medication SCH ML: at 20:14

## 2018-12-21 RX ADMIN — ENOXAPARIN SODIUM SCH MG: 40 INJECTION SUBCUTANEOUS at 08:18

## 2018-12-21 RX ADMIN — Medication SCH ML: at 08:19

## 2018-12-21 RX ADMIN — PROMETHAZINE HYDROCHLORIDE PRN MG: 25 INJECTION INTRAMUSCULAR; INTRAVENOUS at 02:03

## 2018-12-21 RX ADMIN — HYDROMORPHONE HYDROCHLORIDE PRN MG: 1 INJECTION, SOLUTION INTRAMUSCULAR; INTRAVENOUS; SUBCUTANEOUS at 18:33

## 2018-12-21 RX ADMIN — SODIUM CHLORIDE SCH MLS: 9 INJECTION, SOLUTION INTRAVENOUS at 22:14

## 2018-12-21 RX ADMIN — HYDROMORPHONE HYDROCHLORIDE PRN MG: 1 INJECTION, SOLUTION INTRAMUSCULAR; INTRAVENOUS; SUBCUTANEOUS at 22:13

## 2018-12-21 NOTE — PN
Date of Progress Note:  12/20/2018



Subjective:  The patient was seen this morning for followup.  She had surgery done by Dr. Ford ye
sterday late evening for septic arthritis of right knee.  This morning, the patient was having lot of
 pain in her knee and morphine was not helping to control her pain.  She was started on tramadol by m
outh and morphine was discontinued and IV Dilaudid was started.  During the course of day today, her 
nausea was not well controlled with Zofran, so Phenergan was added.



Objective:  Vital Signs:  Reviewed. 

HEENT:  Unremarkable. 

Lungs:  Clear to auscultation. 

Heart:  Sounds normal. 

Abdomen:  Soft.  Bowel sounds normal.  No guarding, rigidity, tenderness, distention. 

Extremities:  Right lower extremity has surgical dressing present with drain tube.



Impression:  

1.Septic arthritis, right knee.

2.Hypertension.



Plan:  We will continue current empiric antibiotic which is vancomycin and cefepime.  Synovial fluid 
culture which was collected in the emergency room prior to starting antibiotic, result is pending.  D
epending on that final result, decision will be made on which particular antibiotic to use.  I will s
ee her tomorrow for followup.  We will continue to follow with Dr. Ford.





MAIA/MODL

DD:  12/20/2018 20:38:23Voice ID:  [id number]

DT:  12/21/2018 02:18:53Report ID:  [id number]

## 2018-12-21 NOTE — P.PN
Subjective


Date of Service: 12/21/18


Chief Complaint: right knee pain


Subjective: No new changes, Tolerating diet, Ambulating, Doing well (culture s 

no growth at  48 hrs will  f/u  may need to treat emperically)





Physical Examination





- Vital Signs


Temperature: 100.1 F


Blood Pressure: 148/71


Pulse: 91


Respirations: 16


Pulse Ox (%): 95





- Studies


Microbiology Data (last 24 hrs): 








12/18/18 22:05   Body Fluid - Knee   Gram Stain - Final

## 2018-12-21 NOTE — CON
History Of Present Illness:  This is a 53-year-old female I was consulted for right-sided septic knee
.  The patient is on IV antibiotic at this time, feels better, but has just had surgery and her leg i
s in a surgical splint.  The patient denies any headache, nausea, vomiting, chest pain, abdominal jordana
n, constipation, or diarrhea.  The patient came in with swelling in her back of her knee which temper
ature went up to 102.  Her white count is normal and also her procalcitonin is normal.  She was given
 IV vancomycin and cefepime and hospitalized for further evaluation.



Past Medical History:  Hyperlipidemia, high blood pressure, restless legs syndrome, arthritis, gastro
esophageal reflux disease.



Past Surgical History:  Total hysterectomy, breast biopsy, cholecystectomy.



Social History:  Nonsmoker and nondrinker.



Family History:  Cancer, hypertension, ovarian cancer, diabetes mellitus, cirrhosis of liver.



Current Medications:  Cefepime and vancomycin.  See MARs for other medication.



Allergies:  CODEINE.



Review of Systems:

A 10-point review was performed.



Physical Examination:

General:  This is a 53-year-old female, lying in bed, not in any acute cardiopulmonary distress. 

Vital Signs:  Temperature 100.1, pulse 91, respirations 16, blood pressure 148/71. 

HEENT:  Unremarkable. 

Neck:  Supple. 

Lungs:  Basal crackles. 

Heart:  S1, S2.  Regular. 

Abdomen:  Soft, nontender.  Bowel sounds positive. 

Extremities:  Right leg under surgical dressing with NATE drain in place.



Laboratory Data:  Shows WBC 7.6, hemoglobin 11.8, platelets are 212.  Chemistry shows sodium 137, pot
assium 3.3, chloride 101, bicarb 30, BUN 0.7, glucose is 88, procalcitonin is 0.05.  Micro data:  E c
giulia in her urine, sensitive to cefepime.



Assessment And Plan:  Right knee septic arthritis versus bursitis status post surgical debridement lo
w-grade fevers.  Continue antibiotic for urinary tract infection secondary to Escherichia coli.  Cont
inue supportive care and wound care.  We will follow the patient closely. 



Thank you, Dr. Alonso and Dr. Dumont, for consult.





NF/MODL

DD:  12/21/2018 12:52:58Voice ID:  [id number]

DT:  12/21/2018 17:06:40Report ID:  [id number]

## 2018-12-22 RX ADMIN — TRAMADOL HYDROCHLORIDE PRN MG: 50 TABLET, COATED ORAL at 03:52

## 2018-12-22 RX ADMIN — HYDROMORPHONE HYDROCHLORIDE PRN MG: 1 INJECTION, SOLUTION INTRAMUSCULAR; INTRAVENOUS; SUBCUTANEOUS at 02:13

## 2018-12-22 RX ADMIN — CEFEPIME SCH MLS: 1 INJECTION, POWDER, FOR SOLUTION INTRAMUSCULAR; INTRAVENOUS at 20:01

## 2018-12-22 RX ADMIN — CEFEPIME SCH MLS: 1 INJECTION, POWDER, FOR SOLUTION INTRAMUSCULAR; INTRAVENOUS at 08:19

## 2018-12-22 RX ADMIN — HYDROMORPHONE HYDROCHLORIDE PRN MG: 1 INJECTION, SOLUTION INTRAMUSCULAR; INTRAVENOUS; SUBCUTANEOUS at 16:18

## 2018-12-22 RX ADMIN — Medication SCH ML: at 08:19

## 2018-12-22 RX ADMIN — ACETAMINOPHEN PRN MG: 325 TABLET ORAL at 16:19

## 2018-12-22 RX ADMIN — HYDROMORPHONE HYDROCHLORIDE PRN MG: 1 INJECTION, SOLUTION INTRAMUSCULAR; INTRAVENOUS; SUBCUTANEOUS at 20:01

## 2018-12-22 RX ADMIN — Medication SCH EA: at 08:17

## 2018-12-22 RX ADMIN — ENOXAPARIN SODIUM SCH MG: 40 INJECTION SUBCUTANEOUS at 08:17

## 2018-12-22 RX ADMIN — SODIUM CHLORIDE SCH MLS: 9 INJECTION, SOLUTION INTRAVENOUS at 16:19

## 2018-12-22 RX ADMIN — HYDROMORPHONE HYDROCHLORIDE PRN MG: 1 INJECTION, SOLUTION INTRAMUSCULAR; INTRAVENOUS; SUBCUTANEOUS at 09:38

## 2018-12-22 RX ADMIN — Medication SCH ML: at 20:00

## 2018-12-22 RX ADMIN — HYDROMORPHONE HYDROCHLORIDE PRN MG: 1 INJECTION, SOLUTION INTRAMUSCULAR; INTRAVENOUS; SUBCUTANEOUS at 06:55

## 2018-12-22 NOTE — PN
Date of Progress Note:  12/21/2018



Subjective:  The patient was seen this morning for followup.  Her nausea that she had yesterday is we
ll controlled with Phenergan.  Her appetite is slowly improving.  The pain is under good control with
 current pain medication.  She has not had a bowel movement since she is in the hospital.



Objective:  Vital Signs:  Reviewed. 

HEENT:  Examination unremarkable. 

Lungs:  Clear to auscultation.  No rhonchi.  No rales. 

Heart:  Sounds normal. 

Abdomen:  Soft.  Bowel sounds normal.  No guarding, rigidity, tenderness, or distention.  Extremities
:  No leg edema.



Laboratory Data:  White count 7.6, hemoglobin 11.8, platelets 212, sodium 137, potassium 3.3, chlorid
e 101, bicarb 30, BUN 9, creatinine 0.70, glucose 120.  Her synovial fluid culture is negative so far
.  Urine culture growing E coli.



Impression:  

1.Right knee acute septic arthritis.

2.Urinary tract infection.

3.Hypertension.

4.Hypokalemia.



Plan:  We will continue to follow with Dr. Ford's from Orthopedic Surgery.  I have requested cons
ultation from Infectious Disease specialist Dr. Covington, for right knee septic arthritis problem and h
is help will be appreciated for antibiotic management.  We will continue current antibiotics.  The van yun is on cefepime and vancomycin.  

Details were discussed with hospitalist, Dr. Dumont, who [facility].





MAIA/MODL

DD:  12/21/2018 23:12:54Voice ID:  [id number]

DT:  12/22/2018 03:32:57Report ID:  [id number]

## 2018-12-22 NOTE — P.PN
Subjective


Date of Service: 12/22/18


Chief Complaint: right knee pain





Patient seen and examined at bedside with RN.  Chart reviewed.  Patient 

currently complaining of having left knee pain.  Stated that while working with 

physical therapy yesterday she twisted her knee and thinks that she might have 

broken something.  Patient unable to bear weight on the affected knee at that 

time.  Right knee status post surgery has been recovering well.





Review of Systems


10-point ROS is otherwise unremarkable





Physical Examination





- Vital Signs


Temperature: 99.7 F


Blood Pressure: 116/65


Pulse: 97


Respirations: 16


Pulse Ox (%): 94





- Physical Exam


General: Alert, In no apparent distress, Obese


HEENT: Atraumatic, PERRLA, EOMI


Neck: Supple, JVD not distended


Respiratory: Clear to auscultation bilaterally, Normal air movement


Cardiovascular: Regular rate/rhythm, Normal S1 S2


Gastrointestinal: Normal bowel sounds, No tenderness


Musculoskeletal: Other (Right knee in knee immobilizer.  Left knee with limited 

range of motion and tenderness to touch on the lateral aspect.)


Integumentary: No rashes


Neurological: Normal speech, Normal tone, Normal affect


Lymphatics: No axilla or inguinal lymphadenopathy





- Studies


Microbiology Data (last 24 hrs): 








12/18/18 22:05   Body Fluid - Knee   Gram Stain - Final





Medications List Reviewed: Yes





Assessment And Plan





- Plan





Impression/plan:  





1.Right knee acute septic arthritis - status post surgical debridement with 

Orthopedics.  Culture negative thus far.  However significant white blood cell 

and neutrophils noted on the for thoracentesis.  Infectious Disease consulted 

for recommendations of IV antibiotics.  Currently on vancomycin cefepime.  Will 

follow up with the infectious disease regarding the length and appropriate 

antibiotics at this time. 





2.Urinary tract infection -urine culture positive for E. coli.  Currently on 

vancomycin cefepime.  Will continue that here in the hospital





3.Hypertension - stable at this time.  Continue home medication





4. Left knee pain - possible ligament versus meniscus tear.  Will get an x-ray 

at this time.  





Disposition: 





Patient is to currently continue on IV antibiotics.  Patient will most likely 

need antibiotics for 4-6 weeks. Will follow up with infectious disease 

regarding the length and the appropriate antibiotics.  Will also follow up with 

a left knee x-ray to rule out any acute abnormality.  Patient to continue 

working with physical therapy at this time.  Currently pending improvement.  


Discharge Plan: Home


Plan to discharge in: 48 Hours





- Code Status/Comfort Care


Code Status Assessed: Yes


Critical Care: No

## 2018-12-22 NOTE — RAD REPORT
EXAM DESCRIPTION:  RAD - Knee Left 2 View - 12/22/2018 2:05 pm

 

CLINICAL HISTORY:   Left knee pain

 

FINDINGS:  No fracture or dislocation is seen.

 

Mild to moderate medial joint space narrowing with small osteophytes.

 

Limited two view series obtained

## 2018-12-23 LAB
ALBUMIN SERPL BCP-MCNC: 2.6 G/DL (ref 3.4–5)
ALP SERPL-CCNC: 64 U/L (ref 45–117)
ALT SERPL W P-5'-P-CCNC: 27 U/L (ref 12–78)
AST SERPL W P-5'-P-CCNC: 24 U/L (ref 15–37)
BUN BLD-MCNC: 16 MG/DL (ref 7–18)
BUN BLD-MCNC: 19 MG/DL (ref 7–18)
GLUCOSE SERPLBLD-MCNC: 140 MG/DL (ref 74–106)
GLUCOSE SERPLBLD-MCNC: 142 MG/DL (ref 74–106)
HCT VFR BLD CALC: 33.9 % (ref 36–45)
LYMPHOCYTES # SPEC AUTO: 1.6 K/UL (ref 0.7–4.9)
PMV BLD: 8.8 FL (ref 7.6–11.3)
POTASSIUM SERPL-SCNC: 3.7 MMOL/L (ref 3.5–5.1)
POTASSIUM SERPL-SCNC: 3.8 MMOL/L (ref 3.5–5.1)
RBC # BLD: 3.83 M/UL (ref 3.86–4.86)

## 2018-12-23 PROCEDURE — 02HV33Z INSERTION OF INFUSION DEVICE INTO SUPERIOR VENA CAVA, PERCUTANEOUS APPROACH: ICD-10-PCS

## 2018-12-23 PROCEDURE — B548ZZA ULTRASONOGRAPHY OF SUPERIOR VENA CAVA, GUIDANCE: ICD-10-PCS

## 2018-12-23 RX ADMIN — CEFEPIME SCH MLS: 1 INJECTION, POWDER, FOR SOLUTION INTRAMUSCULAR; INTRAVENOUS at 20:20

## 2018-12-23 RX ADMIN — CEFEPIME SCH MLS: 1 INJECTION, POWDER, FOR SOLUTION INTRAMUSCULAR; INTRAVENOUS at 08:54

## 2018-12-23 RX ADMIN — HYDROMORPHONE HYDROCHLORIDE PRN MG: 1 INJECTION, SOLUTION INTRAMUSCULAR; INTRAVENOUS; SUBCUTANEOUS at 05:19

## 2018-12-23 RX ADMIN — HYDROMORPHONE HYDROCHLORIDE PRN MG: 1 INJECTION, SOLUTION INTRAMUSCULAR; INTRAVENOUS; SUBCUTANEOUS at 08:55

## 2018-12-23 RX ADMIN — Medication SCH ML: at 10:49

## 2018-12-23 RX ADMIN — HYDROMORPHONE HYDROCHLORIDE PRN MG: 1 INJECTION, SOLUTION INTRAMUSCULAR; INTRAVENOUS; SUBCUTANEOUS at 23:36

## 2018-12-23 RX ADMIN — HYDROMORPHONE HYDROCHLORIDE PRN MG: 1 INJECTION, SOLUTION INTRAMUSCULAR; INTRAVENOUS; SUBCUTANEOUS at 00:07

## 2018-12-23 RX ADMIN — Medication SCH ML: at 19:36

## 2018-12-23 RX ADMIN — TRAMADOL HYDROCHLORIDE PRN MG: 50 TABLET, COATED ORAL at 12:34

## 2018-12-23 RX ADMIN — ENOXAPARIN SODIUM SCH MG: 40 INJECTION SUBCUTANEOUS at 08:54

## 2018-12-23 RX ADMIN — TRAMADOL HYDROCHLORIDE PRN MG: 50 TABLET, COATED ORAL at 21:39

## 2018-12-23 RX ADMIN — HYDROMORPHONE HYDROCHLORIDE PRN MG: 1 INJECTION, SOLUTION INTRAMUSCULAR; INTRAVENOUS; SUBCUTANEOUS at 19:33

## 2018-12-23 RX ADMIN — Medication SCH EA: at 08:55

## 2018-12-23 RX ADMIN — SODIUM CHLORIDE SCH MLS: 9 INJECTION, SOLUTION INTRAVENOUS at 10:48

## 2018-12-23 RX ADMIN — ACETAMINOPHEN PRN MG: 325 TABLET ORAL at 23:33

## 2018-12-23 NOTE — RAD REPORT
EXAM DESCRIPTION:  RAD - Chest Single View - 12/23/2018 4:58 pm

 

CLINICAL HISTORY:  PICC line placement

 

COMPARISON:  Chest Single View dated 12/18/2018; Chest Pa And Lat (2 Views) dated 3/31/2016

 

FINDINGS:  Portable chest was obtained following placement of a left upper extremity PICC line. The c
atheter tip projects over the right atrium. Consider 2 cm of retraction for optimum placement.

## 2018-12-23 NOTE — P.PN
Subjective


Date of Service: 12/23/18


Chief Complaint: right knee pain





Patient seen and examined at bedside with RN.  Chart reviewed.  No complaints 

to offer overnight.  Left knee pain has been resolved.  Working with physical 

therapy at this time.





Review of Systems


10-point ROS is otherwise unremarkable





Physical Examination





- Vital Signs


Temperature: 98.5 F


Blood Pressure: 123/58


Pulse: 92


Respirations: 20


Pulse Ox (%): 96





- Physical Exam


General: Alert, In no apparent distress


HEENT: Atraumatic, PERRLA, EOMI


Neck: Supple, JVD not distended


Respiratory: Clear to auscultation bilaterally, Normal air movement


Cardiovascular: Regular rate/rhythm, Normal S1 S2


Gastrointestinal: Normal bowel sounds, No tenderness


Musculoskeletal: Other (Right knee in immobilizer)


Integumentary: No rashes


Neurological: Normal speech, Normal tone, Normal affect


Lymphatics: No axilla or inguinal lymphadenopathy





- Studies


Microbiology Data (last 24 hrs): 








12/18/18 22:05   Body Fluid - Knee   Gram Stain - Final


12/18/18 22:05   Body Fluid - Knee   Culture & Sensitivity - Final





Medications List Reviewed: Yes





Assessment And Plan





- Plan





Impression/plan:  





1.Right knee acute septic arthritis - status post surgical debridement with 

Orthopedics.  Culture negative thus far.  However significant white blood cell 

and neutrophils noted on the thoracentesis.  Infectious Disease consulted for 

recommendations of IV vancomycin and cefepime for total of 6 weeks.  PICC line 

insertion ordered today.  Will consult case management for arrangement of IV 

antibiotics at home versus placement





2.Urinary tract infection -urine culture positive for E. coli.  Currently on 

vancomycin cefepime.  Will continue that here in the hospital





3.Hypertension - stable at this time.  Continue home medication





4. Left knee pain - x-rays negative at this time.  Pain resolved


Disposition: 





Patient is to currently continue on IV antibiotics.  Recommendation from ID is 

to continue IV antibiotics for 6 weeks.  PICC line is ordered today.  Consulted 

Case management for appropriate placement versus home with IV antibiotics  

Patient to continue working with physical therapy at this time.  Currently 

pending improvement.  


Discharge Plan: Home


Plan to discharge in: Greater than 2 days





- Code Status/Comfort Care


Code Status Assessed: Yes


Critical Care: No

## 2018-12-24 LAB
ALBUMIN SERPL BCP-MCNC: 2.1 G/DL (ref 3.4–5)
ALP SERPL-CCNC: 55 U/L (ref 45–117)
ALT SERPL W P-5'-P-CCNC: 23 U/L (ref 12–78)
AST SERPL W P-5'-P-CCNC: 18 U/L (ref 15–37)
BUN BLD-MCNC: 17 MG/DL (ref 7–18)
GLUCOSE SERPLBLD-MCNC: 121 MG/DL (ref 74–106)
HCT VFR BLD CALC: 31.5 % (ref 36–45)
LYMPHOCYTES # SPEC AUTO: 1.7 K/UL (ref 0.7–4.9)
PMV BLD: 8.9 FL (ref 7.6–11.3)
POTASSIUM SERPL-SCNC: 3.7 MMOL/L (ref 3.5–5.1)
RBC # BLD: 3.57 M/UL (ref 3.86–4.86)

## 2018-12-24 RX ADMIN — HYDROMORPHONE HYDROCHLORIDE PRN MG: 1 INJECTION, SOLUTION INTRAMUSCULAR; INTRAVENOUS; SUBCUTANEOUS at 08:25

## 2018-12-24 RX ADMIN — ENOXAPARIN SODIUM SCH MG: 40 INJECTION SUBCUTANEOUS at 08:17

## 2018-12-24 RX ADMIN — HYDROMORPHONE HYDROCHLORIDE PRN MG: 1 INJECTION, SOLUTION INTRAMUSCULAR; INTRAVENOUS; SUBCUTANEOUS at 12:32

## 2018-12-24 RX ADMIN — ONDANSETRON PRN MG: 2 INJECTION INTRAMUSCULAR; INTRAVENOUS at 04:15

## 2018-12-24 RX ADMIN — Medication SCH EA: at 08:17

## 2018-12-24 RX ADMIN — Medication SCH ML: at 20:25

## 2018-12-24 RX ADMIN — POTASSIUM CHLORIDE SCH MEQ: 10 TABLET, FILM COATED, EXTENDED RELEASE ORAL at 08:18

## 2018-12-24 RX ADMIN — HYDROMORPHONE HYDROCHLORIDE PRN MG: 1 INJECTION, SOLUTION INTRAMUSCULAR; INTRAVENOUS; SUBCUTANEOUS at 04:02

## 2018-12-24 RX ADMIN — Medication SCH ML: at 08:18

## 2018-12-24 RX ADMIN — CEFEPIME SCH MLS: 1 INJECTION, POWDER, FOR SOLUTION INTRAMUSCULAR; INTRAVENOUS at 08:18

## 2018-12-24 RX ADMIN — HYDROMORPHONE HYDROCHLORIDE PRN MG: 1 INJECTION, SOLUTION INTRAMUSCULAR; INTRAVENOUS; SUBCUTANEOUS at 20:25

## 2018-12-24 RX ADMIN — HYDROMORPHONE HYDROCHLORIDE PRN MG: 1 INJECTION, SOLUTION INTRAMUSCULAR; INTRAVENOUS; SUBCUTANEOUS at 16:07

## 2018-12-24 RX ADMIN — ACETAMINOPHEN PRN MG: 325 TABLET ORAL at 20:25

## 2018-12-24 NOTE — P.PN
Subjective


Date of Service: 12/24/18


Chief Complaint: right knee pain





Patient seen and examined at bedside with RN.  Chart reviewed.  No complaints 

to offer overnight.  Left knee pain has been resolved.  Working with physical 

therapy at this time.





Review of Systems


10-point ROS is otherwise unremarkable





Physical Examination





- Vital Signs


Temperature: 99.0 F


Blood Pressure: 143/78


Pulse: 77


Respirations: 22


Pulse Ox (%): 97





- Physical Exam


General: Alert, In no apparent distress


HEENT: Atraumatic, PERRLA, EOMI


Neck: Supple, JVD not distended


Respiratory: Clear to auscultation bilaterally, Normal air movement


Cardiovascular: Regular rate/rhythm, Normal S1 S2


Gastrointestinal: Normal bowel sounds, No tenderness


Musculoskeletal: No tenderness


Integumentary: No rashes


Neurological: Normal speech, Normal tone, Normal affect


Lymphatics: No axilla or inguinal lymphadenopathy





- Studies


Microbiology Data (last 24 hrs): 








12/18/18 18:55   Blood  - Blood   Aerobic Blood Culture - Final


                            No growth in 5 days.


12/18/18 18:55   Blood  - Blood   Anaerobic Blood Culture - Final


                            No growth in 5 days.


12/18/18 18:40   Blood  - Blood   Aerobic Blood Culture - Final


                            No growth in 5 days.


12/18/18 18:40   Blood  - Blood   Anaerobic Blood Culture - Final


                            No growth in 5 days.





Medications List Reviewed: Yes





Assessment And Plan





- Plan





Impression/plan:  





1.Right knee acute septic arthritis - status post surgical debridement with 

Orthopedics.  Culture negative thus far.  However significant white blood cell 

and neutrophils noted on the thoracentesis.  Infectious Disease consulted for 

recommendations of IV vancomycin and cefepime for total of 6 weeks.  PICC line 

done.  Will consult case management for arrangement of IV antibiotics at home. 

Pt states he does not want to go to SNF or LTAC





2.Urinary tract infection -urine culture positive for E. coli.  Currently on 

vancomycin cefepime.  Will continue that here in the hospital





3.Hypertension - stable at this time.  Continue home medication





4. Left knee pain - x-rays negative at this time.  Pain resolved











Disposition: 





Patient is to currently continue on IV antibiotics.  Recommendation from ID is 

to continue IV antibiotics for 6 weeks.  PICC line done.  Consulted Case 

management for home with IV antibiotics  Patient to continue working with 

physical therapy at this time.  


Discharge Plan: Home


Plan to discharge in: 72 Hours





- Code Status/Comfort Care


Code Status Assessed: Yes


Critical Care: No

## 2018-12-24 NOTE — RAD REPORT
EXAM DESCRIPTION:  RAD - Chest Single View - 12/24/2018 12:47 am

 

CLINICAL HISTORY:  PICC line placement

 

 A preliminary report was provided at the time of the study and reviewed prior to final report.

 

COMPARISON:  December 23rd 1627 hours

 

FINDINGS:  Portable chest was obtained following placement of a left upper extremity PICC line. The c
atheter tip is in the right atrium.

## 2018-12-25 VITALS — OXYGEN SATURATION: 99 %

## 2018-12-25 LAB
ALBUMIN SERPL BCP-MCNC: 2.2 G/DL (ref 3.4–5)
ALP SERPL-CCNC: 62 U/L (ref 45–117)
ALT SERPL W P-5'-P-CCNC: 24 U/L (ref 12–78)
AST SERPL W P-5'-P-CCNC: 19 U/L (ref 15–37)
BUN BLD-MCNC: 18 MG/DL (ref 7–18)
GLUCOSE SERPLBLD-MCNC: 137 MG/DL (ref 74–106)
HCT VFR BLD CALC: 32.8 % (ref 36–45)
LYMPHOCYTES # SPEC AUTO: 1.4 K/UL (ref 0.7–4.9)
PMV BLD: 8.7 FL (ref 7.6–11.3)
POTASSIUM SERPL-SCNC: 3.9 MMOL/L (ref 3.5–5.1)
RBC # BLD: 3.73 M/UL (ref 3.86–4.86)

## 2018-12-25 RX ADMIN — Medication SCH ML: at 10:03

## 2018-12-25 RX ADMIN — Medication PRN ML: at 05:41

## 2018-12-25 RX ADMIN — HYDROMORPHONE HYDROCHLORIDE PRN MG: 1 INJECTION, SOLUTION INTRAMUSCULAR; INTRAVENOUS; SUBCUTANEOUS at 05:42

## 2018-12-25 RX ADMIN — ENOXAPARIN SODIUM SCH MG: 40 INJECTION SUBCUTANEOUS at 10:02

## 2018-12-25 RX ADMIN — CEFEPIME SCH MLS: 1 INJECTION, POWDER, FOR SOLUTION INTRAMUSCULAR; INTRAVENOUS at 10:03

## 2018-12-25 RX ADMIN — HYDROMORPHONE HYDROCHLORIDE PRN MG: 1 INJECTION, SOLUTION INTRAMUSCULAR; INTRAVENOUS; SUBCUTANEOUS at 14:37

## 2018-12-25 RX ADMIN — Medication SCH ML: at 20:13

## 2018-12-25 RX ADMIN — Medication SCH EA: at 10:01

## 2018-12-25 RX ADMIN — Medication PRN ML: at 01:33

## 2018-12-25 RX ADMIN — SODIUM CHLORIDE SCH MLS: 9 INJECTION, SOLUTION INTRAVENOUS at 05:09

## 2018-12-25 RX ADMIN — HYDROMORPHONE HYDROCHLORIDE PRN MG: 1 INJECTION, SOLUTION INTRAMUSCULAR; INTRAVENOUS; SUBCUTANEOUS at 20:13

## 2018-12-25 RX ADMIN — POTASSIUM CHLORIDE SCH MEQ: 10 TABLET, FILM COATED, EXTENDED RELEASE ORAL at 10:02

## 2018-12-25 RX ADMIN — HYDROMORPHONE HYDROCHLORIDE PRN MG: 1 INJECTION, SOLUTION INTRAMUSCULAR; INTRAVENOUS; SUBCUTANEOUS at 10:02

## 2018-12-25 RX ADMIN — HYDROMORPHONE HYDROCHLORIDE PRN MG: 1 INJECTION, SOLUTION INTRAMUSCULAR; INTRAVENOUS; SUBCUTANEOUS at 01:32

## 2018-12-25 NOTE — P.PN
Subjective


Date of Service: 12/25/18


Chief Complaint: right knee pain





Patient seen and examined at bedside with RN.  Chart reviewed.  C/o of having 

sweats on BL LE at night time. Also complains of having left knee pain. Working 

with physical therapy at this time. However having hard time keep the cast on 

and understanding the need to keep the cast on. 





Review of Systems


10-point ROS is otherwise unremarkable





Physical Examination





- Vital Signs


Temperature: 97.2 F


Blood Pressure: 126/65


Pulse: 73


Respirations: 16


Pulse Ox (%): 99





- Physical Exam


General: Alert, In no apparent distress


HEENT: Atraumatic, PERRLA, EOMI


Neck: Supple, JVD not distended


Respiratory: Clear to auscultation bilaterally, Normal air movement


Cardiovascular: Regular rate/rhythm, Normal S1 S2


Gastrointestinal: Normal bowel sounds, No tenderness


Musculoskeletal: Cast in place (on the right knee)


Integumentary: No rashes


Neurological: Normal speech, Normal tone, Normal affect


Lymphatics: No axilla or inguinal lymphadenopathy





- Studies


Medications List Reviewed: Yes





Assessment And Plan





- Plan





Impression/plan:  





1.Right knee acute septic arthritis - status post surgical debridement with 

Orthopedics.  Culture negative thus far.  However significant white blood cell 

and neutrophils noted on the thoracentesis.  Infectious Disease consulted 

recommendations are to do IV vancomycin and cefepime for total of 6 weeks.  

PICC line done.  Will consult case management for arrangement of IV antibiotics 

at home. Pt states he does not want to go to SNF or LTAC





2.Urinary tract infection -urine culture positive for E. coli.  Currently on 

vancomycin and cefepime.  Will continue that here in the hospital





3.Hypertension - stable at this time.  Continue home medication





4. Left knee pain - x-rays negative at this time.  Pain resolved





Disposition: 





Patient is to continue on IV antibiotics.  Recommendation from ID is to 

continue IV antibiotics for 6 weeks.  PICC line done.  Consulted Case 

management for home with IV antibiotics  Patient to continue working with 

physical therapy at this time. Educated extensively on the need for ambulation 

and Disease process.  


Discharge Plan: Home


Plan to discharge in: 48 Hours





- Code Status/Comfort Care


Code Status Assessed: Yes


Critical Care: No

## 2018-12-26 VITALS — TEMPERATURE: 97.8 F | DIASTOLIC BLOOD PRESSURE: 82 MMHG | SYSTOLIC BLOOD PRESSURE: 128 MMHG

## 2018-12-26 LAB
ALBUMIN SERPL BCP-MCNC: 2.1 G/DL (ref 3.4–5)
ALP SERPL-CCNC: 59 U/L (ref 45–117)
ALT SERPL W P-5'-P-CCNC: 25 U/L (ref 12–78)
AST SERPL W P-5'-P-CCNC: 22 U/L (ref 15–37)
BUN BLD-MCNC: 17 MG/DL (ref 7–18)
GLUCOSE SERPLBLD-MCNC: 126 MG/DL (ref 74–106)
HCT VFR BLD CALC: 30.6 % (ref 36–45)
LYMPHOCYTES # SPEC AUTO: 1.4 K/UL (ref 0.7–4.9)
MAGNESIUM SERPL-MCNC: 1.9 MG/DL (ref 1.8–2.4)
PMV BLD: 8.5 FL (ref 7.6–11.3)
POTASSIUM SERPL-SCNC: 3.6 MMOL/L (ref 3.5–5.1)
RBC # BLD: 3.48 M/UL (ref 3.86–4.86)

## 2018-12-26 RX ADMIN — Medication SCH EA: at 09:33

## 2018-12-26 RX ADMIN — HYDROMORPHONE HYDROCHLORIDE PRN MG: 1 INJECTION, SOLUTION INTRAMUSCULAR; INTRAVENOUS; SUBCUTANEOUS at 04:57

## 2018-12-26 RX ADMIN — SODIUM CHLORIDE SCH MLS: 9 INJECTION, SOLUTION INTRAVENOUS at 04:58

## 2018-12-26 RX ADMIN — Medication SCH ML: at 09:36

## 2018-12-26 RX ADMIN — ENOXAPARIN SODIUM SCH MG: 40 INJECTION SUBCUTANEOUS at 09:34

## 2018-12-26 RX ADMIN — POTASSIUM CHLORIDE SCH MEQ: 10 TABLET, FILM COATED, EXTENDED RELEASE ORAL at 09:34

## 2018-12-26 RX ADMIN — HYDROMORPHONE HYDROCHLORIDE PRN MG: 1 INJECTION, SOLUTION INTRAMUSCULAR; INTRAVENOUS; SUBCUTANEOUS at 00:47

## 2018-12-26 RX ADMIN — CEFEPIME SCH MLS: 1 INJECTION, POWDER, FOR SOLUTION INTRAMUSCULAR; INTRAVENOUS at 10:18

## 2018-12-26 NOTE — P.DS
Admission Date: 12/19/18


Discharge Date: 12/26/18


Primary Care Provider: Dr. Alonso(Covering for him during Holiday)


Disposition: ROUTINE DISCHARGE


Discharge Condition: GOOD


Reason for Admission: right knee pain


Consultations: 





Orthopedics: Dr. Ford


Infectious Disease: Dr. Covington





Procedures: 





Knee MRI: 


COMPARISON:  Knee Right 3 View dated 12/18/2018 


FINDINGS:  The medial meniscus demonstrates abnormal intermediate signal in the 

posterior horn contacting the inferior articular surface, compatible with a 

nondisplaced tear. The lateral meniscus demonstrates degenerative signal 

without a tear. 


The ACL and PCL are intact. 


The collateral ligaments are intact. 


Static patellar alignment is normal. Patellar retinacula are intact. Patellar 

and quadriceps tendons are normal. Chondromalacia patella involves the medial 

patellar facet. 


No fracture suspected. Osteoarthritic changes are noted particularly involving 

the medial compartment with chondromalacia and subchondral cyst formation. 


A moderate to large joint effusion is present with a large Baker's cyst noted 

measuring 5.7 x 4.9 x 3.2 cm (CC x T x AP). 


IMPRESSION:  Large Baker's cyst with moderate to large joint effusion noted. 


Osteoarthritic changes involving the patellofemoral joint and medial joint 

compartment predominately. 


Small nondisplaced medial meniscal tear.





Venous Doppler: 


COMPARISON:  None. 


TECHNIQUE:  Real-time sonographic evaluation of the right lower extremity deep 

venous systems was performed. 


FINDINGS:  Normal compressibility, flow augmentation, phasic flow and 

spontaneous flow are identified in the right lower extremity common femoral, 

superficial femoral, popliteal and posterior tibial veins. No intraluminal 

filling defects seen. 


A 5 centimeter complex popliteal fossa cyst is present. No cyst rupture or 

hemorrhage suspected. 


IMPRESSION:  No DVT in the right lower extremity. 


Large 5 centimeter popliteal fossa cyst.





Procedure: 


Date: 12/19/2018


Surgeon:  Brent Ford MD


Preoperative Diagnosis:  Septic right knee.


Postoperative Diagnosis:  Septic right knee.


Procedure Performed:  Arthrotomy and I and D of right knee.


First Assistant:  None.


Anesthesia:  General.





Medical Problem List:


Right knee pain secondary to septic right knee complicated with right large 

Baker cyst with moderate to large joint effusion, small nondisplaced medial 

meniscus tear status post arthrotomy and I/D of right knee


UTI, urine culture positive for E coli


Hypertension 


Brief History of Present Illness: 





53-year-old  female presented with swelling, pain to the right knee.  

Patient seen by a PCP prior to admission.  Patient was admitted for for further 

evaluation.  Orthopedics was also consulted.


Hospital Course: 





Patient seen evaluated for right knee pain. Patient found to have septic right 

knee complicated with right large Baker cyst with moderate to large joint 

effusion, small nondisplaced medial meniscus tear.  Patient seen and evaluated 

by orthopedics.  Orthopedics recommended intervention.  Patient had arthrotomy 

and I/D of her right knee.  Patient tolerated procedure well.  Patient also 

evaluated by infectious disease. Infectious Disease recommends IV antibiotic 

therapy for 6 weeks.  PICC line placed.  Patient did not want to go to a 

skilled facility or long-term acute care facility to continue her care.  At 

discharge patient will continue with IV antibiotic therapy at home.  

Arrangements for IV antibiotic therapy and home health has been arranged.  At 

discharge she will continue with vancomycin 2 g IV once daily and Cefepime 2 g 

IV daily for 6 weeks.  Patient will continue with knee immobilizer to the right 

knee.  No dressing changes are needed at this time as per orthopedics.  

Recommendation to follow up with orthopedics within 1 week to follow up this 

hospitalization.  Recommendation to follow up with PCP within 1 week to follow 

up this hospitalization and continue her care.  Patient will need to have a 

vancomycin trough prior to every 3rd dose.   Results are to be sent to PCP for 

further recommendation and adjustment of medication.  Recommendation to recheck 

lab-BMP at least twice a week to monitor her renal function on antibiotics.  

Patient will continue with physical therapy.  Fall precautions in place.  A 

limited supply of tramadol 50 mg 1 pill 3 times a day as needed for pain will 

be provided.  Patient also given diclofenac 50 mg daily to be used as needed 

for pain.





Patient also found to have UTI.  Urine culture positive for E coli.  This is 

sensitive to antibiotics that has been arranged. Patient to continue with UTI 

prevention education.





Patient has hypertension.  This has remained stable during her stay.  Patient 

will continue with lisinopril/hydrochlorothiazide 20/25 mg daily.  Patient will 

also continue with aspirin 81 mg daily.





Patient had a mild rash to the buttocks region.  This is likely fungal in 

nature since the patient is on antibiotic therapy.  Patient will continue with 

nystatin cream this can be further monitored and addressed by her PCP.


Vital Signs/Physical Exam: 














Temp Pulse Resp BP Pulse Ox


 


 97.6 F   79   20   129/65   97 


 


 12/26/18 08:00  12/26/18 08:00  12/26/18 08:00  12/26/18 08:00  12/26/18 08:00








General: Alert, In no apparent distress, Oriented x3, Cooperative


HEENT: Atraumatic


Neck: Supple


Respiratory: Clear to auscultation bilaterally, Normal air movement


Cardiovascular: Normal pulses, Regular rate/rhythm


Gastrointestinal: Normal bowel sounds, Soft and benign, Non-distended, No masses

, No rebound, No guarding


Musculoskeletal: Other (Right knee immobilizer in place)


Integumentary: No erythema, No warmth, No cyanosis


Neurological: Normal speech, Normal strength at 5/5 x4 extr, Normal tone, 

Normal affect


Laboratory Data at Discharge: 














WBC  8.0 K/uL (4.3-10.9)   12/26/18  04:10    


 


Hgb  10.6 g/dL (12.0-15.0)  L  12/26/18  04:10    


 


Hct  30.6 % (36.0-45.0)  L  12/26/18  04:10    


 


Plt Count  274 K/uL (152-406)   12/26/18  04:10    


 


PT  12.7 SECONDS (9.5-12.5)  H  12/18/18  18:40    


 


INR  1.08   12/18/18  18:40    


 


Sodium  136 mmol/L (136-145)   12/26/18  04:10    


 


Potassium  3.6 mmol/L (3.5-5.1)   12/26/18  04:10    


 


BUN  17 mg/dL (7-18)   12/26/18  04:10    


 


Creatinine  0.56 mg/dL (0.55-1.3)   12/26/18  04:10    


 


Glucose  126 mg/dL ()  H  12/26/18  04:10    


 


Uric Acid  5.1 mg/dL (2.6-6.0)   12/19/18  03:40    


 


Magnesium  1.9 mg/dL (1.8-2.4)   12/26/18  04:10    


 


Total Bilirubin  0.6 mg/dL (0.2-1.0)   12/26/18  04:10    


 


AST  22 U/L (15-37)   12/26/18  04:10    


 


ALT  25 U/L (12-78)   12/26/18  04:10    


 


Alkaline Phosphatase  59 U/L ()   12/26/18  04:10    


 


Lipase  228 U/L ()   12/18/18  18:40    








Home Medications: 








Aspirin Chewable [Aspirin Chewable*] 2 tab PO DAILY 12/19/18 


Diclofenac Sodium [Voltaren] 1 tab PO DAILY 12/19/18 


Lisinopril/Hydrochlorothiazide [Zestoretic 20-25 mg Tablet] 1 tab PO DAILY 12/19 /18 


Cefepime [Maxipime] 2 gm IV Q24H #42 bag 12/24/18 


Vancomycin/0.9 % Sod Chloride [Vanco 2 Gram/250 ml-0.9% NaCl] 2 gm IV Q24H #42 

plast..bag 12/24/18 


traMADol HCL [Ultram*] 50 mg PO Q6H PRN #30 tab 12/24/18 


Nystatin Cream [Mycostatin 100MU/Gm Cream*] 15 appl TOP DAILY #1 tube 12/26/18 





New Medications: 


Cefepime [Maxipime] 2 gm IV Q24H #42 bag


Nystatin Cream [Mycostatin 100MU/Gm Cream*] 15 appl TOP DAILY #1 tube


traMADol HCL [Ultram*] 50 mg PO Q6H PRN #30 tab


 PRN Reason: Pain


Vancomycin/0.9 % Sod Chloride [Vanco 2 Gram/250 ml-0.9% NaCl] 2 gm IV Q24H #42 

plast..bag


Patient Discharge Instructions: 1.  Patient will need a follow up with her PCP 

within 1 week to follow up this hospitalization.  2.  Patient seen evaluated 

for right knee pain. Patient found to have septic right knee complicated with 

right large Baker cyst with moderate to large joint effusion, small 

nondisplaced medial meniscus tear.  Patient seen and evaluated by orthopedics.  

Orthopedics recommended intervention.  Patient had arthrotomy and I/D of her 

right knee.  Patient tolerated procedure well.  Patient also evaluated by 

infectious disease. Infectious Disease recommends IV antibiotic therapy for 6 

weeks.  PICC line placed.  Patient did not want to go to a skilled facility or 

long-term acute care facility to continue her care.  At discharge patient will 

continue with IV antibiotic therapy at home.  Arrangements for IV antibiotic 

therapy and home health has been arranged.  At discharge she will continue with 

vancomycin 2 g IV once daily and Cefepime 2 g IV daily for 6 weeks.  Patient 

will continue with knee immobilizer to the right knee.  No dressing changes are 

needed at this time as per orthopedics.  Recommendation to follow up with 

orthopedics within 1 week to follow up this hospitalization.  Recommendation to 

follow up with PCP within 1 week to follow up this hospitalization and continue 

her care.  Patient will need to have a vancomycin trough prior to every 3rd 

dose.   Results are to be sent to PCP for further recommendation and adjustment 

of medication.  Recommendation to recheck lab-BMP at least twice a week to 

monitor her renal function on antibiotics.  Patient will continue with physical 

therapy.  Fall precautions in place.  A limited supply of tramadol 50 mg 1 pill 

3 times a day as needed for pain will be provided.  Patient also given 

diclofenac 50 mg daily to be used as needed for pain.  3.  Patient also found 

to have UTI.  Urine culture positive for E coli.  This is sensitive to 

antibiotics that has been arranged. Patient to continue with UTI prevention 

education.  4.  Patient has hypertension.  This has remained stable during her 

stay.  Patient will continue with lisinopril/hydrochlorothiazide 20/25 mg 

daily.  Patient will also continue with aspirin 81 mg daily.  5.  Patient had a 

mild rash to the buttocks region.  This is likely fungal in nature since the 

patient is on antibiotic therapy.  Patient will continue with nystatin cream 

this can be further monitored and addressed by her PCP.


Diet: Regular


Activity: Ad isac


Followup: 


Lee Alonso MD [Primary Care Provider] - 1-2 Weeks


Rajesh Covington MD [ACTIVE - CAN ADMIT] - 1 Week


Brent Ford MD [ACTIVE - CAN ADMIT] - 1 Week


Time spent managing pt's care (in minutes): 55

## 2018-12-26 NOTE — PN
Subjective:  The patient is lying in bed.  Denies any headache, nausea, vomiting, chest pain, abdomin
al pain, constipation, or diarrhea.



Objective:  Vital Signs:  Temperature 97.6, pulse 79, respirations 16, blood pressure 129/65. 

Lungs:  Clear to auscultation. 

Heart:  S1, S2.  Regular. 

Abdomen:  Soft.  Bowel sounds present. 

Extremities:  Right knee in the surgical wrap.



Laboratory Data:  Shows WBC 8000, hemoglobin 10.6, platelets are 274.  Chemistry shows sodium 136, po
tassium 3.6, chloride 99, bicarb 29, BUN 17, creatinine 0.5, glucose 126.  Micro data:  E coli in the
 urine.



Assessment And Plan:  Right knee septic arthritis.  Urinary tract infection secondary to Escherichia 
coli.  Currently being treated with cefepime and vancomycin.  Continue current medications.  Total co
urse of 6 weeks with vancomycin and cefepime, can be stopped after 7 days.  We will follow the patien
t as needed.





OLIVER/MODL

DD:  12/26/2018 10:34:48Voice ID:  [id number]

DT:  12/26/2018 12:17:06Report ID:  [id number]